# Patient Record
Sex: FEMALE | Race: WHITE | NOT HISPANIC OR LATINO | Employment: OTHER | URBAN - NONMETROPOLITAN AREA
[De-identification: names, ages, dates, MRNs, and addresses within clinical notes are randomized per-mention and may not be internally consistent; named-entity substitution may affect disease eponyms.]

---

## 2019-09-27 ENCOUNTER — OFFICE VISIT (OUTPATIENT)
Dept: OTOLARYNGOLOGY | Facility: CLINIC | Age: 74
End: 2019-09-27
Payer: MEDICARE

## 2019-09-27 VITALS
BODY MASS INDEX: 24.35 KG/M2 | SYSTOLIC BLOOD PRESSURE: 140 MMHG | HEIGHT: 61 IN | HEART RATE: 84 BPM | DIASTOLIC BLOOD PRESSURE: 70 MMHG | WEIGHT: 129 LBS

## 2019-09-27 DIAGNOSIS — H61.23 BILATERAL IMPACTED CERUMEN: ICD-10-CM

## 2019-09-27 DIAGNOSIS — E21.0 PRIMARY HYPERPARATHYROIDISM (HCC): ICD-10-CM

## 2019-09-27 DIAGNOSIS — J35.8 TONSILLITH: Primary | ICD-10-CM

## 2019-09-27 PROBLEM — G89.29 CHRONIC BILATERAL LOW BACK PAIN WITHOUT SCIATICA: Status: ACTIVE | Noted: 2019-06-03

## 2019-09-27 PROBLEM — M54.50 CHRONIC BILATERAL LOW BACK PAIN WITHOUT SCIATICA: Status: ACTIVE | Noted: 2019-06-03

## 2019-09-27 PROBLEM — I35.1 AORTIC INSUFFICIENCY: Status: ACTIVE | Noted: 2019-06-03

## 2019-09-27 PROBLEM — Z86.39: Status: ACTIVE | Noted: 2019-06-03

## 2019-09-27 PROCEDURE — 69210 REMOVE IMPACTED EAR WAX UNI: CPT | Performed by: OTOLARYNGOLOGY

## 2019-09-27 PROCEDURE — 99203 OFFICE O/P NEW LOW 30 MIN: CPT | Performed by: OTOLARYNGOLOGY

## 2019-09-27 RX ORDER — NEBIVOLOL 2.5 MG/1
TABLET ORAL DAILY
COMMUNITY
Start: 2018-12-06

## 2019-09-27 RX ORDER — MAGNESIUM OXIDE 400 MG/1
TABLET ORAL DAILY
COMMUNITY
Start: 2018-02-05

## 2019-09-27 RX ORDER — ALPRAZOLAM 0.25 MG/1
TABLET, ORALLY DISINTEGRATING ORAL
COMMUNITY
Start: 2016-10-31

## 2019-09-27 RX ORDER — ACETAMINOPHEN 500 MG
500 TABLET ORAL EVERY 6 HOURS PRN
COMMUNITY

## 2019-09-27 RX ORDER — IRBESARTAN 150 MG/1
TABLET ORAL 2 TIMES DAILY
COMMUNITY
Start: 2017-11-11

## 2019-09-27 NOTE — PROGRESS NOTES
Review of Systems   Constitutional: Negative  HENT: Positive for sore throat  Eyes: Negative  Respiratory: Negative  Cardiovascular: Negative  Gastrointestinal: Negative  Endocrine: Negative  Genitourinary: Negative  Musculoskeletal: Negative  Skin: Negative  Allergic/Immunologic: Negative  Neurological: Negative  Hematological: Negative  Psychiatric/Behavioral: Negative

## 2019-09-27 NOTE — PROGRESS NOTES
Consultation - Otolaryngology - Head and Neck Surgery  Facial Plastic and Reconstructive Surgery  Violeta Greenico 76 y o  female MRN: [id number]  Encounter: [id number]        Assessment/Plan:  1  Tonsillith     2  Primary hyperparathyroidism ([address] )     3  Bilateral impacted cerumen         Risks, benefits, and alternatives for tonsillectomy and possible adenoidectomy were discussed  Informed consent was obtained  Risks discussed were included, but not limited to, 3% risk of postoperative bleeding requiring operative intervention, the velopharyngeal insufficiency, tooth tongue or gum injury, and postoperative dehydration  We discussed the need for appropriate pain control to prevent dehydration  She will contact our office if she would like to schedule this  Follow-up 3 weeks after surgery  Primary hyperparathyroidism s/p parathyroidectomy: doing well  Will continue to follow with her endocrinologist     Cerumen impaction:  We discussed the nature of cerumen impaction  We discussed appropriate treat with hydrogen peroxide 4-5 drops once per week  We discussed discontinuing the use of any Q-Tips or other objects into the ear  History of Present Illness   Physician Requesting Consult: Vivian Staples MD  Reason for Consult / Principal Problem: Tonsil stones  HPI: Gerber Chavez is a 76y o  year old female who presents with history of chronic tonsillitis with bilateral tonsil stones  She tends to remove these using Q-tips  She notes frequent sore throats and 1 that has lasted since August 24th  She has been on a round of antibiotics without substantial improvement  She had a strep test which was negative  She denies significant problems with reflux  No otalgia  No neck masses  No weight loss  She is considering tonsillectomy at this time but is not certain that that she would like to deal with the pain      She has a history of primary hyperparathyroidism status post left inferior parathyroidectomy in [place]    Review of systems:  [address] was performed and negative except as above or otherwise noted in the medical record  Historical Information   History reviewed  No pertinent past medical history  Past Surgical History:   Procedure Laterality Date    PARATHYROIDECTOMY Left 2012     Social History   Social History     Substance and Sexual Activity   Alcohol Use Not on file     Social History     Substance and Sexual Activity   Drug Use Not on file     Social History     Tobacco Use   Smoking Status Never Smoker   Smokeless Tobacco Never Used     Family History: History reviewed  No pertinent family history  Current Outpatient Medications on File Prior to Visit   Medication Sig    acetaminophen (TYLENOL) 500 mg tablet Take 500 mg by mouth every 6 (six) hours as needed    ALPRAZolam (NIRAVAM) 0 25 MG dissolvable tablet as needed   Cholecalciferol 1000 units capsule daily    irbesartan (AVAPRO) 150 mg tablet 2 (two) times a day    magnesium oxide (MAG-OX) 400 mg tablet daily    nebivolol (BYSTOLIC) 2 5 mg tablet daily     No current facility-administered medications on file prior to visit  Allergies   Allergen Reactions    Cephalexin      MILD    Clarithromycin      MILD    Codeine      MILD    Erythromycin Base      MILD    Molds & Smuts      MILD    Sulfamethoxazole      MILD    Tetracycline      MILD    Trimethoprim      MILD       Vitals:    09/27/19 1321   BP: 140/70   Pulse: 84       Physical Exam   Constitutional: Oriented to person, place, and time  Well-developed and well-nourished, no apparent distress, non-toxic appearance  Cooperative, able to hear and answer questions without difficulty  Voice: Normal voice quality  Head: Normocephalic, atraumatic  No scars, masses or lesions  Face: Symmetric, no edema, no sinus tenderness  Eyes: Vision grossly intact, extra-ocular movement intact    Ears: External ears normal   Bilateral complete cerumen impaction  After debridement the following was noted:  Tympanic membranes intact with intact normal landmarks  No post-auricular erythema or tenderness  Nose: Septum intact, nares clear  Mucosa moist, turbinates well appearing  No crusting, polyps or discharge evident  Oral cavity: Dentition intact  Mucosa moist, lips without lesions or masses  Tongue mobile, floor of mouth soft and flat  Hard palate intact  No masses or lesions  Oropharynx: Uvula is midline, soft palate intact without lesion or mass  Oropharyngeal inlet without obstruction  Tonsils 1+ bilaterally with mild crypts and no obvious tonsil stones today  Jerryl Rm Posterior pharyngeal wall clear  No masses or lesions  Salivary glands:  Parotid glands and submandibular glands symmetric, no enlargement or tenderness  Neck: Normal laryngeal elevation with swallow  Trachea midline  No masses or lesions  No palpable adenopathy  Thyroid: Without tenderness or palpable nodules  Pulmonary/Chest: Normal effort and rate  No respiratory distress  No stertor or stridor  Musculoskeletal: Normal range of motion  Neurological: Cranial nerves 2-12 intact  Skin: Skin is warm and dry  Psychiatric: Normal mood and affect  Procedure: Cerumen debridement bilaterally    Indications: Cerumen impaction bilaterally    Procedure in detail: After informed verbal consent was obtained the ear was visualized using microscopy  Using cerumen loop, suction, and alligator forceps the cerumen was debrided and the findings below were seen  The patient tolerated the procedure well  FINDINGS:  Intact tympanic membranes with intact normal landmarks      Imaging Studies: I have personally reviewed pertinent reports  Lab Results: I have personally reviewed pertinent lab results  Greater than 40 minutes were spent in consultation  More than 1/2 of that time was spent in counselling and coordination of care